# Patient Record
Sex: FEMALE | Race: BLACK OR AFRICAN AMERICAN | NOT HISPANIC OR LATINO | Employment: FULL TIME | URBAN - METROPOLITAN AREA
[De-identification: names, ages, dates, MRNs, and addresses within clinical notes are randomized per-mention and may not be internally consistent; named-entity substitution may affect disease eponyms.]

---

## 2024-04-29 ENCOUNTER — OFFICE VISIT (OUTPATIENT)
Dept: OBSTETRICS AND GYNECOLOGY | Facility: CLINIC | Age: 34
End: 2024-04-29
Payer: COMMERCIAL

## 2024-04-29 ENCOUNTER — LAB (OUTPATIENT)
Dept: LAB | Facility: LAB | Age: 34
End: 2024-04-29
Payer: COMMERCIAL

## 2024-04-29 VITALS
SYSTOLIC BLOOD PRESSURE: 108 MMHG | DIASTOLIC BLOOD PRESSURE: 69 MMHG | WEIGHT: 108 LBS | HEART RATE: 84 BPM | BODY MASS INDEX: 18.54 KG/M2

## 2024-04-29 DIAGNOSIS — Z11.3 SCREENING FOR STD (SEXUALLY TRANSMITTED DISEASE): ICD-10-CM

## 2024-04-29 DIAGNOSIS — Z01.419 WELL WOMAN EXAM WITH ROUTINE GYNECOLOGICAL EXAM: ICD-10-CM

## 2024-04-29 DIAGNOSIS — Z30.016 ENCOUNTER FOR INITIAL PRESCRIPTION OF TRANSDERMAL PATCH HORMONAL CONTRACEPTIVE DEVICE: Primary | ICD-10-CM

## 2024-04-29 LAB
HBV SURFACE AG SERPL QL IA: NONREACTIVE
HCV AB SER QL: NONREACTIVE

## 2024-04-29 PROCEDURE — 86780 TREPONEMA PALLIDUM: CPT

## 2024-04-29 PROCEDURE — 99395 PREV VISIT EST AGE 18-39: CPT | Performed by: NURSE PRACTITIONER

## 2024-04-29 PROCEDURE — 36415 COLL VENOUS BLD VENIPUNCTURE: CPT

## 2024-04-29 PROCEDURE — 87661 TRICHOMONAS VAGINALIS AMPLIF: CPT

## 2024-04-29 PROCEDURE — 87389 HIV-1 AG W/HIV-1&-2 AB AG IA: CPT

## 2024-04-29 PROCEDURE — 87340 HEPATITIS B SURFACE AG IA: CPT

## 2024-04-29 PROCEDURE — 87800 DETECT AGNT MULT DNA DIREC: CPT

## 2024-04-29 PROCEDURE — 86803 HEPATITIS C AB TEST: CPT

## 2024-04-29 RX ORDER — NORELGESTROMIN AND ETHINYL ESTRADIOL 35; 150 UG/MG; UG/MG
PATCH TRANSDERMAL
Qty: 3 PATCH | Refills: 12 | Status: SHIPPED | OUTPATIENT
Start: 2024-04-29 | End: 2025-04-29

## 2024-04-29 ASSESSMENT — ENCOUNTER SYMPTOMS
CARDIOVASCULAR NEGATIVE: 1
GASTROINTESTINAL NEGATIVE: 1
PSYCHIATRIC NEGATIVE: 1
DEPRESSION: 0
ENDOCRINE NEGATIVE: 1
RESPIRATORY NEGATIVE: 1
LOSS OF SENSATION IN FEET: 0
MUSCULOSKELETAL NEGATIVE: 1
EYES NEGATIVE: 1
ALLERGIC/IMMUNOLOGIC NEGATIVE: 1
HEMATOLOGIC/LYMPHATIC NEGATIVE: 1
OCCASIONAL FEELINGS OF UNSTEADINESS: 0
NEUROLOGICAL NEGATIVE: 1
CONSTITUTIONAL NEGATIVE: 1

## 2024-04-29 ASSESSMENT — PATIENT HEALTH QUESTIONNAIRE - PHQ9
1. LITTLE INTEREST OR PLEASURE IN DOING THINGS: NOT AT ALL
2. FEELING DOWN, DEPRESSED OR HOPELESS: NOT AT ALL
SUM OF ALL RESPONSES TO PHQ9 QUESTIONS 1 AND 2: 0

## 2024-04-29 ASSESSMENT — PAIN SCALES - GENERAL: PAINLEVEL: 0-NO PAIN

## 2024-04-29 NOTE — PROGRESS NOTES
Lu Roper, APRN-CNP     Subjective   Anna Rodriguez is a 33 y.o. female who presents for annual exam.   Patient is a G4, P2 here for her annual checkup.  Patient would like STD screening.  Last Pap smear September 2022.    Discussing birth control patient is interested in a birth control method.  She does not like the irregular spotting with Depo-Provera and she is concerned she will not remember to take a pill every day.  Discussed options for birth control patch and birth control ring and she has interest in trying the birth control patch.  Past Medical History:   Diagnosis Date    18 weeks gestation of pregnancy ([facility]) 09/24/2020    18 weeks gestation of pregnancy    20 weeks gestation of pregnancy ([facility]) 10/05/2020    20 weeks gestation of pregnancy    Cervical shortening, second trimester ([facility]) 10/05/2020    Short cervix, antepartum, second trimester    Cervical shortening, unspecified trimester ([facility]) 10/05/2020    Cervix, short (affecting pregnancy)    Chlamydial infection, unspecified     Disease due to chlamydiae    Encounter for pregnancy test, result negative 05/06/2020    Urine pregnancy test negative    Encounter for pregnancy test, result positive ([facility]) 06/22/2020    Positive urine pregnancy test    Encounter for pregnancy test, result unknown 06/22/2020    Visit for confirmation of pregnancy test result with physical exam    Encounter for routine postpartum follow-up ([facility]) 03/19/2021    Postpartum exam    Encounter for supervision of normal pregnancy, unspecified, first trimester ([facility]) 08/24/2020    First trimester pregnancy    Encounter for supervision of normal pregnancy, unspecified, second trimester ([facility]) 11/02/2020    Second trimester pregnancy    Encounter for supervision of normal pregnancy, unspecified, third trimester ([facility]) 01/29/2021    Pregnancy, obstetrical care, third trimester    Maternal care for other known or suspected poor fetal growth,  unspecified trimester, not applicable or unspecified ([facility]) 02/10/2021    IUGR,     Other conditions influencing health status 2021    Small for gestational age fetus    Supervision of other high risk pregnancies, second trimester ([facility]) 10/15/2020    History of  delivery, currently pregnant in second trimester    Vomiting of pregnancy, unspecified ([facility]) 07/10/2020    Nausea and vomiting in pregnancy     Past Surgical History:   Procedure Laterality Date    OTHER SURGICAL HISTORY  2020    Cyst excision       OB History          1    Para        Term                AB   1    Living             SAB   1    IAB        Ectopic        Multiple        Live Births                   No LMP recorded.      Review of Systems   Constitutional: Negative.    HENT: Negative.     Eyes: Negative.    Respiratory: Negative.     Cardiovascular: Negative.    Gastrointestinal: Negative.    Endocrine: Negative.    Genitourinary: Negative.    Musculoskeletal: Negative.    Skin: Negative.    Allergic/Immunologic: Negative.    Neurological: Negative.    Hematological: Negative.    Psychiatric/Behavioral: Negative.     All other systems reviewed and are negative.    Breast: No Complaints   Vaginal: No Complaints        Objective   /69   Pulse 84   Wt 49 kg (108 lb)   BMI 18.54 kg/m²   Physical Exam  Constitutional:       Appearance: Normal appearance.   Genitourinary:      Vulva, rectum and urethral meatus normal.      Right Labia: No tenderness.     Left Labia: No tenderness.     Vaginal bleeding present.      No vaginal prolapse present.     No vaginal atrophy present.       Right Adnexa: no mass present.     Left Adnexa: no mass present.     No cervical motion tenderness.      Uterus is not enlarged.   Breasts:     Right: Normal.      Left: Normal.   Cardiovascular:      Rate and Rhythm: Normal rate.   Pulmonary:      Effort: Pulmonary effort is normal.      Breath sounds: Normal  breath sounds.   Musculoskeletal:         General: Normal range of motion.   Neurological:      General: No focal deficit present.      Mental Status: She is alert.   Skin:     General: Skin is warm and dry.   Vitals and nursing note reviewed.                 Assessment/Plan   Problem List Items Addressed This Visit    None  Visit Diagnoses         Codes    Encounter for initial prescription of transdermal patch hormonal contraceptive device    -  Primary Z30.016    Relevant Medications    norelgestromin-ethin.estradioL (Xulane) 150-35 mcg/24 hr    Well woman exam with routine gynecological exam     Z01.419    Screening for STD (sexually transmitted disease)     Z11.3    Relevant Orders    C. Trachomatis / N. Gonorrhoeae, Amplified Detection    HIV 1/2 Antigen/Antibody Screen with Reflex to Confirmation    Syphilis Screen with Reflex    Trichomonas vaginalis, Nucleic Acid Detection    Hepatitis C Antibody    Hepatitis B Surface Antigen

## 2024-04-30 LAB
C TRACH RRNA SPEC QL NAA+PROBE: NEGATIVE
HIV 1+2 AB+HIV1 P24 AG SERPL QL IA: NONREACTIVE
N GONORRHOEA DNA SPEC QL PROBE+SIG AMP: NEGATIVE
T VAGINALIS RRNA SPEC QL NAA+PROBE: NEGATIVE
TREPONEMA PALLIDUM IGG+IGM AB [PRESENCE] IN SERUM OR PLASMA BY IMMUNOASSAY: NONREACTIVE

## 2024-08-08 ENCOUNTER — APPOINTMENT (OUTPATIENT)
Dept: OBSTETRICS AND GYNECOLOGY | Facility: CLINIC | Age: 34
End: 2024-08-08
Payer: COMMERCIAL

## 2024-09-03 ENCOUNTER — APPOINTMENT (OUTPATIENT)
Dept: OBSTETRICS AND GYNECOLOGY | Facility: CLINIC | Age: 34
End: 2024-09-03
Payer: COMMERCIAL

## 2024-09-03 VITALS
DIASTOLIC BLOOD PRESSURE: 71 MMHG | BODY MASS INDEX: 20.42 KG/M2 | SYSTOLIC BLOOD PRESSURE: 104 MMHG | HEIGHT: 60 IN | WEIGHT: 104 LBS

## 2024-09-03 DIAGNOSIS — N76.0 BV (BACTERIAL VAGINOSIS): ICD-10-CM

## 2024-09-03 DIAGNOSIS — B37.31 YEAST VAGINITIS: ICD-10-CM

## 2024-09-03 DIAGNOSIS — N89.8 VAGINAL DISCHARGE: ICD-10-CM

## 2024-09-03 DIAGNOSIS — B96.89 BV (BACTERIAL VAGINOSIS): ICD-10-CM

## 2024-09-03 DIAGNOSIS — Z11.3 SCREEN FOR STD (SEXUALLY TRANSMITTED DISEASE): Primary | ICD-10-CM

## 2024-09-03 PROCEDURE — 3008F BODY MASS INDEX DOCD: CPT | Performed by: STUDENT IN AN ORGANIZED HEALTH CARE EDUCATION/TRAINING PROGRAM

## 2024-09-03 PROCEDURE — 87491 CHLMYD TRACH DNA AMP PROBE: CPT

## 2024-09-03 PROCEDURE — 87205 SMEAR GRAM STAIN: CPT

## 2024-09-03 PROCEDURE — 87591 N.GONORRHOEAE DNA AMP PROB: CPT

## 2024-09-03 PROCEDURE — 87661 TRICHOMONAS VAGINALIS AMPLIF: CPT

## 2024-09-03 PROCEDURE — 99214 OFFICE O/P EST MOD 30 MIN: CPT | Performed by: STUDENT IN AN ORGANIZED HEALTH CARE EDUCATION/TRAINING PROGRAM

## 2024-09-03 RX ORDER — METRONIDAZOLE 500 MG/1
500 TABLET ORAL 2 TIMES DAILY
Qty: 14 TABLET | Refills: 0 | Status: SHIPPED | OUTPATIENT
Start: 2024-09-03 | End: 2024-09-10

## 2024-09-03 RX ORDER — FLUCONAZOLE 150 MG/1
150 TABLET ORAL ONCE
Qty: 1 TABLET | Refills: 0 | Status: SHIPPED | OUTPATIENT
Start: 2024-09-03 | End: 2024-09-03

## 2024-09-03 ASSESSMENT — ENCOUNTER SYMPTOMS
LOSS OF SENSATION IN FEET: 0
DEPRESSION: 0
OCCASIONAL FEELINGS OF UNSTEADINESS: 0

## 2024-09-03 ASSESSMENT — PAIN SCALES - GENERAL: PAINLEVEL: 0-NO PAIN

## 2024-09-03 NOTE — PROGRESS NOTES
Subjective   Patient ID: Anna Rodriguez is a 34 y.o. female who presents for Consult (Pt here for sti testing and bv/yeast swab/C/o of abnormal vaginal odor/Last pap 9/27/2022 WNL).  Patient does report some vaginal irritation and discharge.  Desires STI testing        Review of Systems   All other systems reviewed and are negative.      Objective   Physical Exam  Vitals reviewed. Exam conducted with a chaperone present.   Constitutional:       General: She is not in acute distress.     Appearance: She is not ill-appearing.   Pulmonary:      Effort: Pulmonary effort is normal.   Genitourinary:     General: Normal vulva.      Exam position: Lithotomy position.      Vagina: No signs of injury and foreign body. Vaginal discharge (Thin yellowish discharge slight malodor) present. No erythema, tenderness, bleeding, lesions or prolapsed vaginal walls.      Cervix: No discharge, friability, lesion, erythema or cervical bleeding.   Neurological:      Mental Status: She is alert.         Assessment/Plan   Diagnoses and all orders for this visit:  Screen for STD (sexually transmitted disease)  -     Trichomonas vaginalis, Nucleic Acid Detection  -     C. Trachomatis / N. Gonorrhoeae, Amplified Detection  -     HIV 1/2 Antigen/Antibody Screen with Reflex to Confirmation; Future  -     Hepatitis B Surface Antigen; Future  -     Hepatitis C Antibody; Future  -     Syphilis Screen with Reflex; Future  Yeast vaginitis  -     metroNIDAZOLE (Flagyl) 500 mg tablet; Take 1 tablet (500 mg) by mouth 2 times a day for 7 days.  BV (bacterial vaginosis)  -     fluconazole (Diflucan) 150 mg tablet; Take 1 tablet (150 mg) by mouth 1 time for 1 dose.  Vaginal discharge  -     Vaginitis Gram Stain For Bacterial Vaginosis + Yeast    Here for STI screening and vaginitis workup.  Swabs obtained and serologic tests ordered.  Speculum exam consistent with yeast and BV coinfection.  Will treat empirically with Flagyl and Diflucan and follow-up  swabs.       Tayo Barrios MD 09/03/24 6:10 PM

## 2024-09-04 LAB
BACTERIAL VAGINOSIS VAG-IMP: NORMAL
C TRACH RRNA SPEC QL NAA+PROBE: NEGATIVE
CLUE CELLS VAG LPF-#/AREA: NORMAL /[LPF]
N GONORRHOEA DNA SPEC QL PROBE+SIG AMP: NEGATIVE
NUGENT SCORE: 4
T VAGINALIS RRNA SPEC QL NAA+PROBE: NEGATIVE
YEAST VAG WET PREP-#/AREA: NORMAL

## 2024-11-20 ENCOUNTER — OFFICE VISIT (OUTPATIENT)
Dept: OBSTETRICS AND GYNECOLOGY | Facility: CLINIC | Age: 34
End: 2024-11-20
Payer: COMMERCIAL

## 2024-11-20 VITALS
DIASTOLIC BLOOD PRESSURE: 75 MMHG | HEART RATE: 81 BPM | HEIGHT: 60 IN | WEIGHT: 103.8 LBS | BODY MASS INDEX: 20.38 KG/M2 | SYSTOLIC BLOOD PRESSURE: 108 MMHG | RESPIRATION RATE: 16 BRPM

## 2024-11-20 DIAGNOSIS — Z11.3 SCREEN FOR STD (SEXUALLY TRANSMITTED DISEASE): ICD-10-CM

## 2024-11-20 DIAGNOSIS — R30.0 BURNING WITH URINATION: ICD-10-CM

## 2024-11-20 DIAGNOSIS — Z20.2 POSSIBLE EXPOSURE TO STD: ICD-10-CM

## 2024-11-20 DIAGNOSIS — N89.8 VAGINAL IRRITATION: Primary | ICD-10-CM

## 2024-11-20 LAB
POC APPEARANCE, URINE: ABNORMAL
POC BILIRUBIN, URINE: NEGATIVE
POC BLOOD, URINE: ABNORMAL
POC COLOR, URINE: YELLOW
POC GLUCOSE, URINE: NEGATIVE MG/DL
POC KETONES, URINE: ABNORMAL MG/DL
POC LEUKOCYTES, URINE: ABNORMAL
POC NITRITE,URINE: NEGATIVE
POC PH, URINE: 5.5 PH
POC PROTEIN, URINE: ABNORMAL MG/DL
POC SPECIFIC GRAVITY, URINE: >=1.03
POC UROBILINOGEN, URINE: 0.2 EU/DL

## 2024-11-20 PROCEDURE — 87205 SMEAR GRAM STAIN: CPT

## 2024-11-20 PROCEDURE — 99459 PELVIC EXAMINATION: CPT | Performed by: OBSTETRICS & GYNECOLOGY

## 2024-11-20 PROCEDURE — 87591 N.GONORRHOEAE DNA AMP PROB: CPT

## 2024-11-20 PROCEDURE — 3008F BODY MASS INDEX DOCD: CPT | Performed by: OBSTETRICS & GYNECOLOGY

## 2024-11-20 PROCEDURE — 87491 CHLMYD TRACH DNA AMP PROBE: CPT

## 2024-11-20 PROCEDURE — 87661 TRICHOMONAS VAGINALIS AMPLIF: CPT

## 2024-11-20 PROCEDURE — 99214 OFFICE O/P EST MOD 30 MIN: CPT | Performed by: OBSTETRICS & GYNECOLOGY

## 2024-11-20 PROCEDURE — 81003 URINALYSIS AUTO W/O SCOPE: CPT | Performed by: OBSTETRICS & GYNECOLOGY

## 2024-11-20 ASSESSMENT — PAIN SCALES - GENERAL: PAINLEVEL_OUTOF10: 0-NO PAIN

## 2024-11-20 NOTE — PROGRESS NOTES
"SUBJECTIVE    34 y.o.  Having periods female presents for   Chief Complaint   Patient presents with    burning with urination    Vaginal Itching      Pt presents with--  1) In September she was told she \"had an infection\" and she was treated with Diflucan and Flagyl (testing, though, was negative). Took partially but stopped when she discovered she was pregnant (ended in termination). She now has a return of odor with vaginal itching.  No fevers/chills.  Denies urinary symptoms.      Although she had negative GC.CT.trich testing in September she desires repeat testing as she is unsure of her partner's fidelity.    OB/GYN History  Patient's last menstrual period was 08/15/2024 (approximate).    Social History     Substance and Sexual Activity   Sexual Activity Yes    Partners: Male       OB History    Para Term  AB Living   4 2     1 2   SAB IAB Ectopic Multiple Live Births   1       2      # Outcome Date GA Lbr Chinedu/2nd Weight Sex Type Anes PTL Lv   4             3 Para            2 Para            1 SAB                Past Medical History  She has no past medical history on file.    Surgical History  She has a past surgical history that includes Other surgical history (2020).     Social History  She reports that she has been smoking. She has never used smokeless tobacco. She reports current alcohol use. She reports that she does not use drugs.    Screenings  Social Drivers of Health     Tobacco Use: Medium Risk (10/9/2024)    [facility]    Patient History     Smoking Tobacco Use: Never     Smokeless Tobacco Use: Never     Passive Exposure: Yes   Alcohol Use: Not on file   Financial Resource Strain: Not on file   Food Insecurity: Not on file   Transportation Needs: Not on file   Physical Activity: Not on file   Stress: Not on file   Social Connections: Not on file   Intimate Partner Violence: Not on file   Depression: Not at risk (2024)    PHQ-2     " PHQ-2 Score: 0   Housing Stability: Not on file   Utilities: Not on file   Digital Equity: Not on file   Health Literacy: Not on file         OBJECTIVE  Vitals:    11/20/24 1459   BP: 108/75   BP Location: Left arm   Patient Position: Sitting   BP Cuff Size: Adult   Pulse: 81   Resp: 16   Weight: 47.1 kg (103 lb 12.8 oz)   Height: 1.524 m (5')     Body mass index is 20.27 kg/m².     Chaperone: Present: yes  Physical Exam  Constitutional:       Appearance: Normal appearance.   Genitourinary:      No lesions in the vagina.      Right Labia: No rash, tenderness or lesions.     Left Labia: No tenderness, lesions or rash.     Vaginal discharge (cloudy brownish) present.      No vaginal erythema, bleeding or ulceration.      No cervical discharge, friability or lesion.   Abdominal:      General: Abdomen is flat. There is no distension.      Tenderness: There is no abdominal tenderness.   Neurological:      Mental Status: She is alert.          ASSESSMENT & PLAN  Problem List Items Addressed This Visit    None  Visit Diagnoses       Vaginal irritation    -  Primary    Relevant Orders    C. trachomatis / N. gonorrhoeae, Amplified    Trichomonas vaginalis, Amplified    Vaginitis Gram Stain For Bacterial Vaginosis + Yeast    Possible exposure to STD        Burning with urination        Relevant Orders    POCT UA Automated manually resulted (Completed)    Screen for STD (sexually transmitted disease)        Relevant Orders    C. trachomatis / N. gonorrhoeae, Amplified    Trichomonas vaginalis, Amplified    Hepatitis B Surface Antigen    Hepatitis C Antibody    HIV 1/2 Antigen/Antibody Screen with Reflex to Confirmation    Syphilis Screen with Reflex            Follow up: Will follow up results.    Mike Lvoett MD  Obstetrics & Gynecology  11/20/24

## 2024-11-21 DIAGNOSIS — A74.9 CHLAMYDIA INFECTION: ICD-10-CM

## 2024-11-21 DIAGNOSIS — N89.8 VAGINAL DISCHARGE: Primary | ICD-10-CM

## 2024-11-21 LAB
C TRACH RRNA SPEC QL NAA+PROBE: POSITIVE
N GONORRHOEA DNA SPEC QL PROBE+SIG AMP: NEGATIVE
T VAGINALIS RRNA SPEC QL NAA+PROBE: NEGATIVE

## 2024-11-21 RX ORDER — DOXYCYCLINE 100 MG/1
100 CAPSULE ORAL 2 TIMES DAILY
Qty: 14 CAPSULE | Refills: 0 | Status: SHIPPED | OUTPATIENT
Start: 2024-11-21 | End: 2024-11-28

## 2024-11-22 DIAGNOSIS — N89.8 VAGINAL IRRITATION: Primary | ICD-10-CM

## 2024-11-22 LAB
CLUE CELLS VAG LPF-#/AREA: PRESENT /[LPF]
NUGENT SCORE: 10
YEAST VAG WET PREP-#/AREA: ABNORMAL

## 2024-11-22 RX ORDER — METRONIDAZOLE 500 MG/1
500 TABLET ORAL 2 TIMES DAILY
Qty: 14 TABLET | Refills: 0 | Status: SHIPPED | OUTPATIENT
Start: 2024-11-22 | End: 2024-11-29

## 2025-03-10 ENCOUNTER — APPOINTMENT (OUTPATIENT)
Dept: OBSTETRICS AND GYNECOLOGY | Facility: CLINIC | Age: 35
End: 2025-03-10
Payer: COMMERCIAL

## 2025-03-10 VITALS
DIASTOLIC BLOOD PRESSURE: 69 MMHG | WEIGHT: 110.4 LBS | HEIGHT: 60 IN | BODY MASS INDEX: 21.68 KG/M2 | SYSTOLIC BLOOD PRESSURE: 107 MMHG

## 2025-03-10 DIAGNOSIS — Z11.3 SCREENING FOR STD (SEXUALLY TRANSMITTED DISEASE): Primary | ICD-10-CM

## 2025-03-10 PROCEDURE — 99213 OFFICE O/P EST LOW 20 MIN: CPT | Performed by: NURSE PRACTITIONER

## 2025-03-10 PROCEDURE — 3008F BODY MASS INDEX DOCD: CPT | Performed by: NURSE PRACTITIONER

## 2025-03-10 ASSESSMENT — ENCOUNTER SYMPTOMS
DEPRESSION: 0
LOSS OF SENSATION IN FEET: 0
OCCASIONAL FEELINGS OF UNSTEADINESS: 0

## 2025-03-10 ASSESSMENT — PAIN SCALES - GENERAL: PAINLEVEL_OUTOF10: 0-NO PAIN

## 2025-03-10 NOTE — PROGRESS NOTES
Subjective   Patient ID: Anna Rodriguez is a 34 y.o. female who presents for Follow-up (Pt is here for her follow up appointment from 11/20/24 for positive STI's. Pt has no concerns or issue at this time./No pain/No fall/LMP 3/8/25/Pt declined chaperone/ST MA).  HPI  Patient is here today for test of cure from positive chlamydia she was treated for in November.  She is on her menstrual cycle at this time so we will send a urine sample.  Denies having any sexual contact since the treatment  Review of Systems   All other systems reviewed and are negative.      Objective   Physical Exam  Constitutional:       Appearance: She is normal weight.   HENT:      Head: Normocephalic.   Pulmonary:      Effort: Pulmonary effort is normal.   Genitourinary:     General: Normal vulva.   Musculoskeletal:      Cervical back: Normal range of motion.   Neurological:      Mental Status: She is alert.   Psychiatric:         Mood and Affect: Mood normal.         Assessment/Plan   Problem List Items Addressed This Visit    None  Visit Diagnoses         Codes    Screening for STD (sexually transmitted disease)    -  Primary Z11.3    Relevant Orders    C. trachomatis / N. gonorrhoeae, Amplified, Urogenital    Trichomonas vaginalis, Amplified        Patient will be notified for any abnormal findings         ZULEIMA Nevarez-CNP 03/10/25 4:39 PM

## 2025-03-11 LAB
C TRACH RRNA SPEC QL NAA+PROBE: NOT DETECTED
N GONORRHOEA RRNA SPEC QL NAA+PROBE: NOT DETECTED
QUEST GC CT AMPLIFIED (ALWAYS MESSAGE): NORMAL
T VAGINALIS RRNA SPEC QL NAA+PROBE: NOT DETECTED